# Patient Record
Sex: MALE | Race: WHITE
[De-identification: names, ages, dates, MRNs, and addresses within clinical notes are randomized per-mention and may not be internally consistent; named-entity substitution may affect disease eponyms.]

---

## 2018-02-25 ENCOUNTER — HOSPITAL ENCOUNTER (EMERGENCY)
Dept: HOSPITAL 4 - SED | Age: 63
Discharge: HOME | End: 2018-02-25
Payer: MEDICAID

## 2018-02-25 VITALS — WEIGHT: 260 LBS | HEIGHT: 73 IN | SYSTOLIC BLOOD PRESSURE: 151 MMHG | BODY MASS INDEX: 34.46 KG/M2

## 2018-02-25 VITALS — SYSTOLIC BLOOD PRESSURE: 136 MMHG

## 2018-02-25 DIAGNOSIS — J44.1: Primary | ICD-10-CM

## 2018-02-25 DIAGNOSIS — I25.2: ICD-10-CM

## 2018-02-25 DIAGNOSIS — F17.210: ICD-10-CM

## 2018-02-25 DIAGNOSIS — I10: ICD-10-CM

## 2018-02-25 LAB
ALBUMIN SERPL BCP-MCNC: 3.1 G/DL (ref 3.4–4.8)
ALT SERPL W P-5'-P-CCNC: 23 U/L (ref 12–78)
ANION GAP SERPL CALCULATED.3IONS-SCNC: 3 MMOL/L (ref 5–15)
AST SERPL W P-5'-P-CCNC: 17 U/L (ref 10–37)
BASOPHILS # BLD AUTO: 0 K/UL (ref 0–0.2)
BASOPHILS NFR BLD AUTO: 0.7 % (ref 0–2)
BILIRUB SERPL-MCNC: 0.1 MG/DL (ref 0–1)
BUN SERPL-MCNC: 14 MG/DL (ref 8–21)
CALCIUM SERPL-MCNC: 9.1 MG/DL (ref 8.4–11)
CHLORIDE SERPL-SCNC: 99 MMOL/L (ref 98–107)
CREAT SERPL-MCNC: 1.13 MG/DL (ref 0.55–1.3)
EOSINOPHIL # BLD AUTO: 0 K/UL (ref 0–0.4)
EOSINOPHIL NFR BLD AUTO: 0.5 % (ref 0–4)
ERYTHROCYTE [DISTWIDTH] IN BLOOD BY AUTOMATED COUNT: 12.6 % (ref 9–15)
GFR SERPL CREATININE-BSD FRML MDRD: 84 ML/MIN (ref 90–?)
GLUCOSE SERPL-MCNC: 120 MG/DL (ref 70–99)
HCT VFR BLD AUTO: 45.4 % (ref 36–54)
HGB BLD-MCNC: 15.1 G/DL (ref 14–18)
INR PPP: 0.9 (ref 0.8–1.2)
LYMPHOCYTES # BLD AUTO: 1.3 K/UL (ref 1–5.5)
LYMPHOCYTES NFR BLD AUTO: 19.1 % (ref 20.5–51.5)
MCH RBC QN AUTO: 30 PG (ref 27–31)
MCHC RBC AUTO-ENTMCNC: 33 % (ref 32–36)
MCV RBC AUTO: 91 FL (ref 79–98)
MONOCYTES # BLD MANUAL: 0.6 K/UL (ref 0–1)
MONOCYTES # BLD MANUAL: 8.8 % (ref 1.7–9.3)
NEUTROPHILS # BLD AUTO: 4.8 K/UL (ref 1.8–7.7)
NEUTROPHILS NFR BLD AUTO: 70.9 % (ref 40–70)
PLATELET # BLD AUTO: 226 K/UL (ref 130–430)
POTASSIUM SERPL-SCNC: 4.4 MMOL/L (ref 3.5–5.1)
PROTHROMBIN TIME: 9.6 SECS (ref 9.5–12.5)
RBC # BLD AUTO: 5 MIL/UL (ref 4.2–6.2)
SODIUM SERPLBLD-SCNC: 135 MMOL/L (ref 136–145)
WBC # BLD AUTO: 6.7 K/UL (ref 4.8–10.8)

## 2018-02-25 PROCEDURE — 80053 COMPREHEN METABOLIC PANEL: CPT

## 2018-02-25 PROCEDURE — 83605 ASSAY OF LACTIC ACID: CPT

## 2018-02-25 PROCEDURE — 85730 THROMBOPLASTIN TIME PARTIAL: CPT

## 2018-02-25 PROCEDURE — 36415 COLL VENOUS BLD VENIPUNCTURE: CPT

## 2018-02-25 PROCEDURE — 96375 TX/PRO/DX INJ NEW DRUG ADDON: CPT

## 2018-02-25 PROCEDURE — 84484 ASSAY OF TROPONIN QUANT: CPT

## 2018-02-25 PROCEDURE — 93005 ELECTROCARDIOGRAM TRACING: CPT

## 2018-02-25 PROCEDURE — 96365 THER/PROPH/DIAG IV INF INIT: CPT

## 2018-02-25 PROCEDURE — 87040 BLOOD CULTURE FOR BACTERIA: CPT

## 2018-02-25 PROCEDURE — 71275 CT ANGIOGRAPHY CHEST: CPT

## 2018-02-25 PROCEDURE — 71045 X-RAY EXAM CHEST 1 VIEW: CPT

## 2018-02-25 PROCEDURE — 96366 THER/PROPH/DIAG IV INF ADDON: CPT

## 2018-02-25 PROCEDURE — 85025 COMPLETE CBC W/AUTO DIFF WBC: CPT

## 2018-02-25 PROCEDURE — 94640 AIRWAY INHALATION TREATMENT: CPT

## 2018-02-25 PROCEDURE — 99285 EMERGENCY DEPT VISIT HI MDM: CPT

## 2018-02-25 PROCEDURE — 85610 PROTHROMBIN TIME: CPT

## 2018-10-04 ENCOUNTER — HOSPITAL ENCOUNTER (EMERGENCY)
Dept: HOSPITAL 4 - SED | Age: 63
Discharge: HOME | End: 2018-10-04
Payer: COMMERCIAL

## 2018-10-04 VITALS — HEIGHT: 74 IN | WEIGHT: 250 LBS | BODY MASS INDEX: 32.08 KG/M2

## 2018-10-04 VITALS — SYSTOLIC BLOOD PRESSURE: 146 MMHG

## 2018-10-04 DIAGNOSIS — I25.2: ICD-10-CM

## 2018-10-04 DIAGNOSIS — L03.116: Primary | ICD-10-CM

## 2018-10-04 DIAGNOSIS — J44.9: ICD-10-CM

## 2018-10-04 DIAGNOSIS — F17.210: ICD-10-CM

## 2018-10-04 DIAGNOSIS — I10: ICD-10-CM

## 2018-10-04 LAB
ALBUMIN SERPL BCP-MCNC: 3.4 G/DL (ref 3.4–4.8)
ALT SERPL W P-5'-P-CCNC: 22 U/L (ref 12–78)
ANION GAP SERPL CALCULATED.3IONS-SCNC: 0 MMOL/L (ref 5–15)
APPEARANCE UR: CLEAR
AST SERPL W P-5'-P-CCNC: 15 U/L (ref 10–37)
BASOPHILS # BLD AUTO: 0.1 K/UL (ref 0–0.2)
BASOPHILS NFR BLD AUTO: 1.5 % (ref 0–2)
BILIRUB SERPL-MCNC: 0.1 MG/DL (ref 0–1)
BILIRUB UR QL STRIP: NEGATIVE
BUN SERPL-MCNC: 11 MG/DL (ref 8–21)
CALCIUM SERPL-MCNC: 9.3 MG/DL (ref 8.4–11)
CHLORIDE SERPL-SCNC: 105 MMOL/L (ref 98–107)
COLOR UR: YELLOW
CREAT SERPL-MCNC: 1.08 MG/DL (ref 0.55–1.3)
EOSINOPHIL # BLD AUTO: 0.3 K/UL (ref 0–0.4)
EOSINOPHIL NFR BLD AUTO: 4 % (ref 0–4)
ERYTHROCYTE [DISTWIDTH] IN BLOOD BY AUTOMATED COUNT: 12.9 % (ref 9–15)
ETHANOL SERPL-MCNC: < 3 MG/DL (ref ?–10)
GFR SERPL CREATININE-BSD FRML MDRD: 89 ML/MIN (ref 90–?)
GLUCOSE SERPL-MCNC: 101 MG/DL (ref 70–99)
GLUCOSE UR STRIP-MCNC: NEGATIVE MG/DL
HCT VFR BLD AUTO: 48.8 % (ref 36–54)
HGB BLD-MCNC: 15.7 G/DL (ref 14–18)
HGB UR QL STRIP: NEGATIVE
INR PPP: 0.9 (ref 0.8–1.2)
KETONES UR STRIP-MCNC: NEGATIVE MG/DL
LEUKOCYTE ESTERASE UR QL STRIP: NEGATIVE
LYMPHOCYTES # BLD AUTO: 1.9 K/UL (ref 1–5.5)
LYMPHOCYTES NFR BLD AUTO: 28.8 % (ref 20.5–51.5)
MCH RBC QN AUTO: 30 PG (ref 27–31)
MCHC RBC AUTO-ENTMCNC: 32 % (ref 32–36)
MCV RBC AUTO: 92 FL (ref 79–98)
MONOCYTES # BLD MANUAL: 0.8 K/UL (ref 0–1)
MONOCYTES # BLD MANUAL: 12.4 % (ref 1.7–9.3)
NEUTROPHILS # BLD AUTO: 3.5 K/UL (ref 1.8–7.7)
NEUTROPHILS NFR BLD AUTO: 53.3 % (ref 40–70)
NITRITE UR QL STRIP: NEGATIVE
PH UR STRIP: 5.5 [PH] (ref 5–8)
PLATELET # BLD AUTO: 260 K/UL (ref 130–430)
POTASSIUM SERPL-SCNC: 4.6 MMOL/L (ref 3.5–5.1)
PROT UR QL STRIP: NEGATIVE
PROTHROMBIN TIME: 9.5 SECS (ref 9.5–12.5)
RBC # BLD AUTO: 5.29 MIL/UL (ref 4.2–6.2)
SODIUM SERPLBLD-SCNC: 138 MMOL/L (ref 136–145)
SP GR UR STRIP: >=1.03 (ref 1–1.03)
T4 FREE SERPL-MCNC: 0.6 NG/DL (ref 0.8–1.5)
UROBILINOGEN UR STRIP-MCNC: 0.2 MG/DL (ref 0.2–1)
WBC # BLD AUTO: 6.6 K/UL (ref 4.8–10.8)

## 2018-10-04 PROCEDURE — 83880 ASSAY OF NATRIURETIC PEPTIDE: CPT

## 2018-10-04 PROCEDURE — 84484 ASSAY OF TROPONIN QUANT: CPT

## 2018-10-04 PROCEDURE — 81003 URINALYSIS AUTO W/O SCOPE: CPT

## 2018-10-04 PROCEDURE — G0482 DRUG TEST DEF 15-21 CLASSES: HCPCS

## 2018-10-04 PROCEDURE — 71045 X-RAY EXAM CHEST 1 VIEW: CPT

## 2018-10-04 PROCEDURE — 80053 COMPREHEN METABOLIC PANEL: CPT

## 2018-10-04 PROCEDURE — 99285 EMERGENCY DEPT VISIT HI MDM: CPT

## 2018-10-04 PROCEDURE — 36415 COLL VENOUS BLD VENIPUNCTURE: CPT

## 2018-10-04 PROCEDURE — 87040 BLOOD CULTURE FOR BACTERIA: CPT

## 2018-10-04 PROCEDURE — 84439 ASSAY OF FREE THYROXINE: CPT

## 2018-10-04 PROCEDURE — 85025 COMPLETE CBC W/AUTO DIFF WBC: CPT

## 2018-10-04 PROCEDURE — 85610 PROTHROMBIN TIME: CPT

## 2018-10-04 PROCEDURE — 93970 EXTREMITY STUDY: CPT

## 2018-10-04 PROCEDURE — 83605 ASSAY OF LACTIC ACID: CPT

## 2018-10-04 NOTE — NUR
PT ALERT, WITH COMPLAINTS OF SWOLLEN LEGS, SKIN WARM AND ERYTHEMA TO LEFT LEG, 
WOUND NOTED TO LEFT TOE NEXT TO BIG TOE,  SKIN TO RIGHT LEG COLD, PULSES 
PALPABLE.

## 2018-10-04 NOTE — NUR
Patient given written and verbal discharge instructions and verbalizes 
understanding.  ER MD SIMMONS discussed with patient the results and treatment 
provided. Patient in stable condition. ID arm band removed. 

Rx of BACTRIM AND AUGMENTIN  given. Patient educated on pain management and to 
follow up with PMD. Pain Scale 2.

Opportunity for questions provided and answered. Medication side effect fact 
sheet provided.

## 2018-12-11 ENCOUNTER — HOSPITAL ENCOUNTER (EMERGENCY)
Dept: HOSPITAL 4 - SED | Age: 63
Discharge: HOME | End: 2018-12-11
Payer: COMMERCIAL

## 2018-12-11 VITALS — BODY MASS INDEX: 33.37 KG/M2 | HEIGHT: 74 IN | WEIGHT: 260 LBS

## 2018-12-11 VITALS — SYSTOLIC BLOOD PRESSURE: 146 MMHG

## 2018-12-11 VITALS — SYSTOLIC BLOOD PRESSURE: 151 MMHG

## 2018-12-11 DIAGNOSIS — I11.0: ICD-10-CM

## 2018-12-11 DIAGNOSIS — E11.9: ICD-10-CM

## 2018-12-11 DIAGNOSIS — I25.2: ICD-10-CM

## 2018-12-11 DIAGNOSIS — L03.116: Primary | ICD-10-CM

## 2018-12-11 DIAGNOSIS — I50.9: ICD-10-CM

## 2018-12-11 LAB
ALBUMIN SERPL BCP-MCNC: 3.4 G/DL (ref 3.4–4.8)
ALT SERPL W P-5'-P-CCNC: 26 U/L (ref 12–78)
ANION GAP SERPL CALCULATED.3IONS-SCNC: 7 MMOL/L (ref 5–15)
APPEARANCE UR: CLEAR
AST SERPL W P-5'-P-CCNC: 19 U/L (ref 10–37)
BASOPHILS # BLD AUTO: 0.1 K/UL (ref 0–0.2)
BASOPHILS NFR BLD AUTO: 2 % (ref 0–2)
BILIRUB SERPL-MCNC: 0.2 MG/DL (ref 0–1)
BILIRUB UR QL STRIP: NEGATIVE
BUN SERPL-MCNC: 12 MG/DL (ref 8–21)
CALCIUM SERPL-MCNC: 9 MG/DL (ref 8.4–11)
CHLORIDE SERPL-SCNC: 104 MMOL/L (ref 98–107)
COLOR UR: YELLOW
CREAT SERPL-MCNC: 1.06 MG/DL (ref 0.55–1.3)
EOSINOPHIL # BLD AUTO: 0.2 K/UL (ref 0–0.4)
EOSINOPHIL NFR BLD AUTO: 3.5 % (ref 0–4)
ERYTHROCYTE [DISTWIDTH] IN BLOOD BY AUTOMATED COUNT: 13.3 % (ref 9–15)
GFR SERPL CREATININE-BSD FRML MDRD: 90 ML/MIN (ref 90–?)
GLUCOSE SERPL-MCNC: 117 MG/DL (ref 70–99)
GLUCOSE UR STRIP-MCNC: NEGATIVE MG/DL
HCT VFR BLD AUTO: 46.4 % (ref 36–54)
HGB BLD-MCNC: 15.3 G/DL (ref 14–18)
HGB UR QL STRIP: NEGATIVE
INR PPP: 1 (ref 0.8–1.2)
KETONES UR STRIP-MCNC: NEGATIVE MG/DL
LEUKOCYTE ESTERASE UR QL STRIP: NEGATIVE
LIPASE SERPL-CCNC: 131 U/L (ref 73–393)
LYMPHOCYTES # BLD AUTO: 1.8 K/UL (ref 1–5.5)
LYMPHOCYTES NFR BLD AUTO: 27.1 % (ref 20.5–51.5)
MCH RBC QN AUTO: 31 PG (ref 27–31)
MCHC RBC AUTO-ENTMCNC: 33 % (ref 32–36)
MCV RBC AUTO: 93 FL (ref 79–98)
MONOCYTES # BLD MANUAL: 0.9 K/UL (ref 0–1)
MONOCYTES # BLD MANUAL: 14.2 % (ref 1.7–9.3)
NEUTROPHILS # BLD AUTO: 3.6 K/UL (ref 1.8–7.7)
NEUTROPHILS NFR BLD AUTO: 53.2 % (ref 40–70)
NITRITE UR QL STRIP: NEGATIVE
PH UR STRIP: 5.5 [PH] (ref 5–8)
PLATELET # BLD AUTO: 267 K/UL (ref 130–430)
POTASSIUM SERPL-SCNC: 4 MMOL/L (ref 3.5–5.1)
PROT UR QL STRIP: NEGATIVE
PROTHROMBIN TIME: 9.9 SECS (ref 9.5–12.5)
RBC # BLD AUTO: 4.96 MIL/UL (ref 4.2–6.2)
SODIUM SERPLBLD-SCNC: 140 MMOL/L (ref 136–145)
SP GR UR STRIP: >=1.03 (ref 1–1.03)
UROBILINOGEN UR STRIP-MCNC: 1 MG/DL (ref 0.2–1)
WBC # BLD AUTO: 6.6 K/UL (ref 4.8–10.8)

## 2018-12-11 PROCEDURE — 84484 ASSAY OF TROPONIN QUANT: CPT

## 2018-12-11 PROCEDURE — 36415 COLL VENOUS BLD VENIPUNCTURE: CPT

## 2018-12-11 PROCEDURE — 93005 ELECTROCARDIOGRAM TRACING: CPT

## 2018-12-11 PROCEDURE — 83880 ASSAY OF NATRIURETIC PEPTIDE: CPT

## 2018-12-11 PROCEDURE — 99284 EMERGENCY DEPT VISIT MOD MDM: CPT

## 2018-12-11 PROCEDURE — 81003 URINALYSIS AUTO W/O SCOPE: CPT

## 2018-12-11 PROCEDURE — 96375 TX/PRO/DX INJ NEW DRUG ADDON: CPT

## 2018-12-11 PROCEDURE — 87040 BLOOD CULTURE FOR BACTERIA: CPT

## 2018-12-11 PROCEDURE — 83605 ASSAY OF LACTIC ACID: CPT

## 2018-12-11 PROCEDURE — 96365 THER/PROPH/DIAG IV INF INIT: CPT

## 2018-12-11 PROCEDURE — 85730 THROMBOPLASTIN TIME PARTIAL: CPT

## 2018-12-11 PROCEDURE — 83690 ASSAY OF LIPASE: CPT

## 2018-12-11 PROCEDURE — 71045 X-RAY EXAM CHEST 1 VIEW: CPT

## 2018-12-11 PROCEDURE — 80053 COMPREHEN METABOLIC PANEL: CPT

## 2018-12-11 PROCEDURE — 85025 COMPLETE CBC W/AUTO DIFF WBC: CPT

## 2018-12-11 PROCEDURE — 85610 PROTHROMBIN TIME: CPT

## 2018-12-11 NOTE — NUR
Patient AOx4, ambulatory, presents to ER with complaint of continued redness to 
left lower leg. Patient states he received ABT therapy (Keflex) for the leg 
redness. Patient was also to bear no weight onto left leg and maintain elevated 
above level of the heart. Patient began to see improvement as patient was 
following treatment. The leg redness returned as soon as the patient began to 
bear weight on it. Patient states pain to left lower extremity 7/10. No other 
symptoms or complaints.

## 2018-12-11 NOTE — NUR
Patient given written and verbal discharge instructions and verbalizes 
understanding.  ER MD discussed with patient the results and treatment 
provided. Patient in stable condition. ID arm band removed. IV catheter removed 
intact and dressing applied, no active bleeding. Rx of Keflex and Tylenol 
given. Patient educated on pain management and to follow up with PMD. Pain 
Scale 0/10. Opportunity for questions provided and answered. Medication side 
effect fact sheet provided.

## 2019-01-13 ENCOUNTER — HOSPITAL ENCOUNTER (EMERGENCY)
Dept: HOSPITAL 4 - SED | Age: 64
Discharge: HOME | End: 2019-01-13
Payer: COMMERCIAL

## 2019-01-13 VITALS — SYSTOLIC BLOOD PRESSURE: 137 MMHG

## 2019-01-13 VITALS — HEIGHT: 74 IN | BODY MASS INDEX: 33.37 KG/M2 | SYSTOLIC BLOOD PRESSURE: 144 MMHG | WEIGHT: 260 LBS

## 2019-01-13 DIAGNOSIS — J44.9: ICD-10-CM

## 2019-01-13 DIAGNOSIS — L97.929: ICD-10-CM

## 2019-01-13 DIAGNOSIS — I25.2: ICD-10-CM

## 2019-01-13 DIAGNOSIS — I10: ICD-10-CM

## 2019-01-13 DIAGNOSIS — E11.9: ICD-10-CM

## 2019-01-13 DIAGNOSIS — L03.116: Primary | ICD-10-CM

## 2019-01-13 DIAGNOSIS — F17.210: ICD-10-CM

## 2019-01-13 DIAGNOSIS — Z71.6: ICD-10-CM

## 2019-01-13 LAB
ALBUMIN SERPL BCP-MCNC: 3 G/DL (ref 3.4–4.8)
ALT SERPL W P-5'-P-CCNC: 17 U/L (ref 12–78)
AMYLASE SERPL-CCNC: 29 U/L (ref 0–100)
ANION GAP SERPL CALCULATED.3IONS-SCNC: 11 MMOL/L (ref 5–15)
APPEARANCE UR: CLEAR
AST SERPL W P-5'-P-CCNC: 14 U/L (ref 10–37)
BASOPHILS # BLD AUTO: 0.3 K/UL (ref 0–0.2)
BASOPHILS NFR BLD AUTO: 3.6 % (ref 0–2)
BILIRUB SERPL-MCNC: 0.5 MG/DL (ref 0–1)
BILIRUB UR QL STRIP: NEGATIVE
BUN SERPL-MCNC: 11 MG/DL (ref 8–21)
CALCIUM SERPL-MCNC: 9.1 MG/DL (ref 8.4–11)
CHLORIDE SERPL-SCNC: 100 MMOL/L (ref 98–107)
COLOR UR: YELLOW
CREAT SERPL-MCNC: 1.04 MG/DL (ref 0.55–1.3)
EOSINOPHIL # BLD AUTO: 0.1 K/UL (ref 0–0.4)
EOSINOPHIL NFR BLD AUTO: 1.2 % (ref 0–4)
ERYTHROCYTE [DISTWIDTH] IN BLOOD BY AUTOMATED COUNT: 12.8 % (ref 9–15)
ERYTHROCYTE [SEDIMENTATION RATE] IN BLOOD BY WESTERGREN METHOD: 25 MM/HR (ref 0–15)
GFR SERPL CREATININE-BSD FRML MDRD: 92 ML/MIN (ref 90–?)
GLUCOSE SERPL-MCNC: 99 MG/DL (ref 70–99)
GLUCOSE UR STRIP-MCNC: NEGATIVE MG/DL
HCT VFR BLD AUTO: 44.5 % (ref 36–54)
HGB BLD-MCNC: 14.6 G/DL (ref 14–18)
HGB UR QL STRIP: NEGATIVE
INR PPP: 1 (ref 0.8–1.2)
KETONES UR STRIP-MCNC: NEGATIVE MG/DL
LEUKOCYTE ESTERASE UR QL STRIP: NEGATIVE
LIPASE SERPL-CCNC: 102 U/L (ref 73–393)
LYMPHOCYTES # BLD AUTO: 1.7 K/UL (ref 1–5.5)
LYMPHOCYTES NFR BLD AUTO: 18.5 % (ref 20.5–51.5)
MCH RBC QN AUTO: 31 PG (ref 27–31)
MCHC RBC AUTO-ENTMCNC: 33 % (ref 32–36)
MCV RBC AUTO: 93 FL (ref 79–98)
MONOCYTES # BLD MANUAL: 1.2 K/UL (ref 0–1)
MONOCYTES # BLD MANUAL: 13.3 % (ref 1.7–9.3)
NEUTROPHILS # BLD AUTO: 5.7 K/UL (ref 1.8–7.7)
NEUTROPHILS NFR BLD AUTO: 63.4 % (ref 40–70)
NITRITE UR QL STRIP: NEGATIVE
PH UR STRIP: 5.5 [PH] (ref 5–8)
PLATELET # BLD AUTO: 244 K/UL (ref 130–430)
POTASSIUM SERPL-SCNC: 3.6 MMOL/L (ref 3.5–5.1)
PROT UR QL STRIP: NEGATIVE
PROTHROMBIN TIME: 9.9 SECS (ref 9.5–12.5)
RBC # BLD AUTO: 4.8 MIL/UL (ref 4.2–6.2)
SODIUM SERPLBLD-SCNC: 137 MMOL/L (ref 136–145)
SP GR UR STRIP: <1.005 (ref 1–1.03)
UROBILINOGEN UR STRIP-MCNC: 1 MG/DL (ref 0.2–1)
WBC # BLD AUTO: 9 K/UL (ref 4.8–10.8)

## 2019-01-13 PROCEDURE — 85651 RBC SED RATE NONAUTOMATED: CPT

## 2019-01-13 PROCEDURE — 93005 ELECTROCARDIOGRAM TRACING: CPT

## 2019-01-13 PROCEDURE — 85610 PROTHROMBIN TIME: CPT

## 2019-01-13 PROCEDURE — 82150 ASSAY OF AMYLASE: CPT

## 2019-01-13 PROCEDURE — 85730 THROMBOPLASTIN TIME PARTIAL: CPT

## 2019-01-13 PROCEDURE — 83605 ASSAY OF LACTIC ACID: CPT

## 2019-01-13 PROCEDURE — 82550 ASSAY OF CK (CPK): CPT

## 2019-01-13 PROCEDURE — 81003 URINALYSIS AUTO W/O SCOPE: CPT

## 2019-01-13 PROCEDURE — 83690 ASSAY OF LIPASE: CPT

## 2019-01-13 PROCEDURE — 80053 COMPREHEN METABOLIC PANEL: CPT

## 2019-01-13 PROCEDURE — 99284 EMERGENCY DEPT VISIT MOD MDM: CPT

## 2019-01-13 PROCEDURE — 96375 TX/PRO/DX INJ NEW DRUG ADDON: CPT

## 2019-01-13 PROCEDURE — 96365 THER/PROPH/DIAG IV INF INIT: CPT

## 2019-01-13 PROCEDURE — 87040 BLOOD CULTURE FOR BACTERIA: CPT

## 2019-01-13 PROCEDURE — 71045 X-RAY EXAM CHEST 1 VIEW: CPT

## 2019-01-13 PROCEDURE — 85025 COMPLETE CBC W/AUTO DIFF WBC: CPT

## 2019-01-13 PROCEDURE — 36415 COLL VENOUS BLD VENIPUNCTURE: CPT
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
## 2019-01-13 NOTE — NUR
Patient given written and verbal discharge instructions and verbalizes 
understanding.  ER MD discussed with patient the results and treatment 
provided. Patient in stable condition. ID arm band removed. IV catheter removed 
intact and dressing applied, no active bleeding.

Rx of BACTRIM, BACITRACIN, TYLENOL given. Patient educated on pain management 
and to follow up with PMD. Pain Scale 0/10.

Opportunity for questions provided and answered. Medication side effect fact 
sheet provided. DRESSING SUPPLIES GIVEN TO PT.

## 2019-02-08 ENCOUNTER — HOSPITAL ENCOUNTER (INPATIENT)
Dept: HOSPITAL 4 - SED | Age: 64
LOS: 5 days | Discharge: HOME | DRG: 593 | End: 2019-02-13
Attending: FAMILY MEDICINE | Admitting: FAMILY MEDICINE
Payer: COMMERCIAL

## 2019-02-08 VITALS — WEIGHT: 297.13 LBS | HEIGHT: 74 IN | BODY MASS INDEX: 38.13 KG/M2

## 2019-02-08 VITALS — SYSTOLIC BLOOD PRESSURE: 160 MMHG

## 2019-02-08 VITALS — SYSTOLIC BLOOD PRESSURE: 118 MMHG

## 2019-02-08 DIAGNOSIS — I10: ICD-10-CM

## 2019-02-08 DIAGNOSIS — T37.8X5A: ICD-10-CM
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
## 2019-02-08 DIAGNOSIS — J44.9: ICD-10-CM

## 2019-02-08 DIAGNOSIS — L03.115: ICD-10-CM

## 2019-02-08 DIAGNOSIS — B36.9: ICD-10-CM

## 2019-02-08 DIAGNOSIS — I25.2: ICD-10-CM

## 2019-02-08 DIAGNOSIS — I87.2: ICD-10-CM

## 2019-02-08 DIAGNOSIS — Z71.6: ICD-10-CM

## 2019-02-08 DIAGNOSIS — L27.0: ICD-10-CM

## 2019-02-08 DIAGNOSIS — L97.929: Primary | ICD-10-CM

## 2019-02-08 DIAGNOSIS — F17.200: ICD-10-CM

## 2019-02-08 DIAGNOSIS — E66.01: ICD-10-CM

## 2019-02-08 DIAGNOSIS — T36.8X5A: ICD-10-CM

## 2019-02-08 DIAGNOSIS — L03.116: ICD-10-CM

## 2019-02-08 DIAGNOSIS — E11.9: ICD-10-CM

## 2019-02-08 LAB
ALBUMIN SERPL BCP-MCNC: 2.9 G/DL (ref 3.4–4.8)
ALT SERPL W P-5'-P-CCNC: 17 U/L (ref 12–78)
ANION GAP SERPL CALCULATED.3IONS-SCNC: 7 MMOL/L (ref 5–15)
AST SERPL W P-5'-P-CCNC: 13 U/L (ref 10–37)
BASOPHILS # BLD AUTO: 0 K/UL (ref 0–0.2)
BASOPHILS NFR BLD AUTO: 0.6 % (ref 0–2)
BILIRUB SERPL-MCNC: 0.2 MG/DL (ref 0–1)
BUN SERPL-MCNC: 13 MG/DL (ref 8–21)
CALCIUM SERPL-MCNC: 8.7 MG/DL (ref 8.4–11)
CHLORIDE SERPL-SCNC: 104 MMOL/L (ref 98–107)
CREAT SERPL-MCNC: 1.11 MG/DL (ref 0.55–1.3)
EOSINOPHIL # BLD AUTO: 0.5 K/UL (ref 0–0.4)
EOSINOPHIL NFR BLD AUTO: 7.7 % (ref 0–4)
ERYTHROCYTE [DISTWIDTH] IN BLOOD BY AUTOMATED COUNT: 12.9 % (ref 9–15)
GFR SERPL CREATININE-BSD FRML MDRD: 86 ML/MIN (ref 90–?)
GLUCOSE SERPL-MCNC: 105 MG/DL (ref 70–99)
HCT VFR BLD AUTO: 43.5 % (ref 36–54)
HGB BLD-MCNC: 14.1 G/DL (ref 14–18)
INR PPP: 1 (ref 0.8–1.2)
LYMPHOCYTES # BLD AUTO: 1.4 K/UL (ref 1–5.5)
LYMPHOCYTES NFR BLD AUTO: 21 % (ref 20.5–51.5)
MCH RBC QN AUTO: 30 PG (ref 27–31)
MCHC RBC AUTO-ENTMCNC: 32 % (ref 32–36)
MCV RBC AUTO: 92 FL (ref 79–98)
MONOCYTES # BLD MANUAL: 0.9 K/UL (ref 0–1)
MONOCYTES # BLD MANUAL: 13.4 % (ref 1.7–9.3)
NEUTROPHILS # BLD AUTO: 4 K/UL (ref 1.8–7.7)
NEUTROPHILS NFR BLD AUTO: 57.3 % (ref 40–70)
PLATELET # BLD AUTO: 261 K/UL (ref 130–430)
POTASSIUM SERPL-SCNC: 4.2 MMOL/L (ref 3.5–5.1)
PROTHROMBIN TIME: 10.2 SECS (ref 9.5–12.5)
RBC # BLD AUTO: 4.76 MIL/UL (ref 4.2–6.2)
SODIUM SERPLBLD-SCNC: 143 MMOL/L (ref 136–145)
WBC # BLD AUTO: 6.8 K/UL (ref 4.8–10.8)

## 2019-02-09 VITALS — SYSTOLIC BLOOD PRESSURE: 137 MMHG

## 2019-02-09 VITALS — SYSTOLIC BLOOD PRESSURE: 131 MMHG

## 2019-02-09 VITALS — SYSTOLIC BLOOD PRESSURE: 143 MMHG

## 2019-02-09 LAB
APPEARANCE UR: CLEAR
BILIRUB UR QL STRIP: NEGATIVE
COLOR UR: YELLOW
GLUCOSE UR STRIP-MCNC: NEGATIVE MG/DL
HGB UR QL STRIP: NEGATIVE
KETONES UR STRIP-MCNC: NEGATIVE MG/DL
LEUKOCYTE ESTERASE UR QL STRIP: NEGATIVE
NITRITE UR QL STRIP: NEGATIVE
PH UR STRIP: 5.5 [PH] (ref 5–8)
PROT UR QL STRIP: NEGATIVE
SP GR UR STRIP: >=1.03 (ref 1–1.03)
UROBILINOGEN UR STRIP-MCNC: 1 MG/DL (ref 0.2–1)

## 2019-02-09 RX ADMIN — LEVALBUTEROL SCH MG: 1.25 SOLUTION, CONCENTRATE RESPIRATORY (INHALATION) at 23:09

## 2019-02-09 RX ADMIN — DEXTROSE MONOHYDRATE SCH MLS/HR: 50 INJECTION, SOLUTION INTRAVENOUS at 13:55

## 2019-02-09 RX ADMIN — SODIUM CHLORIDE SCH MLS/HR: 9 INJECTION, SOLUTION INTRAVENOUS at 08:51

## 2019-02-09 RX ADMIN — DEXTROSE MONOHYDRATE SCH MLS/HR: 50 INJECTION, SOLUTION INTRAVENOUS at 06:17

## 2019-02-09 RX ADMIN — DEXTROSE MONOHYDRATE SCH MLS/HR: 50 INJECTION, SOLUTION INTRAVENOUS at 22:34

## 2019-02-09 RX ADMIN — SODIUM CHLORIDE SCH MLS/HR: 9 INJECTION, SOLUTION INTRAVENOUS at 20:30

## 2019-02-09 RX ADMIN — FUROSEMIDE SCH MG: 10 INJECTION, SOLUTION INTRAMUSCULAR; INTRAVENOUS at 22:35

## 2019-02-10 VITALS — SYSTOLIC BLOOD PRESSURE: 130 MMHG

## 2019-02-10 VITALS — SYSTOLIC BLOOD PRESSURE: 143 MMHG

## 2019-02-10 VITALS — SYSTOLIC BLOOD PRESSURE: 135 MMHG

## 2019-02-10 VITALS — SYSTOLIC BLOOD PRESSURE: 133 MMHG

## 2019-02-10 VITALS — SYSTOLIC BLOOD PRESSURE: 140 MMHG

## 2019-02-10 RX ADMIN — FUROSEMIDE SCH MG: 10 INJECTION, SOLUTION INTRAMUSCULAR; INTRAVENOUS at 21:30

## 2019-02-10 RX ADMIN — SODIUM CHLORIDE SCH MLS/HR: 9 INJECTION, SOLUTION INTRAVENOUS at 08:33

## 2019-02-10 RX ADMIN — DEXTROSE MONOHYDRATE SCH MLS/HR: 50 INJECTION, SOLUTION INTRAVENOUS at 05:15

## 2019-02-10 RX ADMIN — LEVALBUTEROL SCH MG: 1.25 SOLUTION, CONCENTRATE RESPIRATORY (INHALATION) at 23:24

## 2019-02-10 RX ADMIN — SODIUM CHLORIDE SCH MLS/HR: 9 INJECTION, SOLUTION INTRAVENOUS at 21:30

## 2019-02-10 RX ADMIN — DEXTROSE MONOHYDRATE SCH MLS/HR: 50 INJECTION, SOLUTION INTRAVENOUS at 13:17

## 2019-02-10 RX ADMIN — LEVALBUTEROL SCH MG: 1.25 SOLUTION, CONCENTRATE RESPIRATORY (INHALATION) at 07:56

## 2019-02-10 RX ADMIN — FLUTICASONE FUROATE AND VILANTEROL TRIFENATATE SCH INH: 200; 25 POWDER RESPIRATORY (INHALATION) at 08:24

## 2019-02-10 RX ADMIN — LEVALBUTEROL SCH MG: 1.25 SOLUTION, CONCENTRATE RESPIRATORY (INHALATION) at 16:00

## 2019-02-10 RX ADMIN — FUROSEMIDE SCH MG: 10 INJECTION, SOLUTION INTRAMUSCULAR; INTRAVENOUS at 08:24

## 2019-02-10 RX ADMIN — DEXTROSE MONOHYDRATE SCH MLS/HR: 50 INJECTION, SOLUTION INTRAVENOUS at 21:30

## 2019-02-10 RX ADMIN — ENOXAPARIN SODIUM SCH MG: 40 INJECTION SUBCUTANEOUS at 08:26

## 2019-02-11 VITALS — SYSTOLIC BLOOD PRESSURE: 119 MMHG

## 2019-02-11 VITALS — SYSTOLIC BLOOD PRESSURE: 133 MMHG

## 2019-02-11 VITALS — SYSTOLIC BLOOD PRESSURE: 136 MMHG

## 2019-02-11 VITALS — SYSTOLIC BLOOD PRESSURE: 135 MMHG

## 2019-02-11 RX ADMIN — SODIUM CHLORIDE SCH MLS/HR: 9 INJECTION, SOLUTION INTRAVENOUS at 08:48

## 2019-02-11 RX ADMIN — FLUTICASONE FUROATE AND VILANTEROL TRIFENATATE SCH INH: 200; 25 POWDER RESPIRATORY (INHALATION) at 08:49

## 2019-02-11 RX ADMIN — LEVALBUTEROL SCH MG: 1.25 SOLUTION, CONCENTRATE RESPIRATORY (INHALATION) at 07:00

## 2019-02-11 RX ADMIN — FUROSEMIDE SCH MG: 10 INJECTION, SOLUTION INTRAMUSCULAR; INTRAVENOUS at 08:48

## 2019-02-11 RX ADMIN — DEXTROSE SCH MLS/HR: 50 INJECTION, SOLUTION INTRAVENOUS at 21:10

## 2019-02-11 RX ADMIN — SODIUM CHLORIDE SCH MLS/HR: 9 INJECTION, SOLUTION INTRAVENOUS at 22:34

## 2019-02-11 RX ADMIN — FUROSEMIDE SCH MG: 10 INJECTION, SOLUTION INTRAMUSCULAR; INTRAVENOUS at 21:10

## 2019-02-11 RX ADMIN — FLUCONAZOLE IN SODIUM CHLORIDE SCH MLS/HR: 2 INJECTION, SOLUTION INTRAVENOUS at 11:11

## 2019-02-11 RX ADMIN — LEVALBUTEROL SCH MG: 1.25 SOLUTION, CONCENTRATE RESPIRATORY (INHALATION) at 23:00

## 2019-02-11 RX ADMIN — ENOXAPARIN SODIUM SCH MG: 40 INJECTION SUBCUTANEOUS at 08:54

## 2019-02-11 RX ADMIN — DEXTROSE MONOHYDRATE SCH MLS/HR: 50 INJECTION, SOLUTION INTRAVENOUS at 06:47

## 2019-02-11 RX ADMIN — LEVALBUTEROL SCH MG: 1.25 SOLUTION, CONCENTRATE RESPIRATORY (INHALATION) at 17:57

## 2019-02-12 VITALS — SYSTOLIC BLOOD PRESSURE: 139 MMHG

## 2019-02-12 VITALS — SYSTOLIC BLOOD PRESSURE: 137 MMHG

## 2019-02-12 VITALS — SYSTOLIC BLOOD PRESSURE: 126 MMHG

## 2019-02-12 VITALS — SYSTOLIC BLOOD PRESSURE: 138 MMHG

## 2019-02-12 RX ADMIN — LEVALBUTEROL SCH MG: 1.25 SOLUTION, CONCENTRATE RESPIRATORY (INHALATION) at 23:05

## 2019-02-12 RX ADMIN — CASTOR OIL AND BALSAM, PERU SCH GM: 788; 87 OINTMENT TOPICAL at 20:53

## 2019-02-12 RX ADMIN — FLUTICASONE FUROATE AND VILANTEROL TRIFENATATE SCH INH: 200; 25 POWDER RESPIRATORY (INHALATION) at 10:09

## 2019-02-12 RX ADMIN — LEVALBUTEROL SCH MG: 1.25 SOLUTION, CONCENTRATE RESPIRATORY (INHALATION) at 08:00

## 2019-02-12 RX ADMIN — FUROSEMIDE SCH MG: 10 INJECTION, SOLUTION INTRAMUSCULAR; INTRAVENOUS at 10:10

## 2019-02-12 RX ADMIN — SODIUM CHLORIDE SCH MLS/HR: 9 INJECTION, SOLUTION INTRAVENOUS at 20:54

## 2019-02-12 RX ADMIN — SODIUM CHLORIDE SCH MLS/HR: 9 INJECTION, SOLUTION INTRAVENOUS at 11:18

## 2019-02-12 RX ADMIN — ENOXAPARIN SODIUM SCH MG: 40 INJECTION SUBCUTANEOUS at 10:17

## 2019-02-12 RX ADMIN — FLUCONAZOLE IN SODIUM CHLORIDE SCH MLS/HR: 2 INJECTION, SOLUTION INTRAVENOUS at 10:09

## 2019-02-12 RX ADMIN — DEXTROSE SCH MLS/HR: 50 INJECTION, SOLUTION INTRAVENOUS at 11:16

## 2019-02-12 RX ADMIN — Medication SCH GM: at 17:01

## 2019-02-12 RX ADMIN — FUROSEMIDE SCH MG: 10 INJECTION, SOLUTION INTRAMUSCULAR; INTRAVENOUS at 20:55

## 2019-02-12 RX ADMIN — LEVALBUTEROL SCH MG: 1.25 SOLUTION, CONCENTRATE RESPIRATORY (INHALATION) at 15:58

## 2019-02-12 RX ADMIN — DEXTROSE SCH MLS/HR: 50 INJECTION, SOLUTION INTRAVENOUS at 19:29

## 2019-02-13 VITALS — SYSTOLIC BLOOD PRESSURE: 137 MMHG

## 2019-02-13 VITALS — SYSTOLIC BLOOD PRESSURE: 134 MMHG

## 2019-02-13 VITALS — SYSTOLIC BLOOD PRESSURE: 105 MMHG

## 2019-02-13 LAB
BASOPHILS # BLD AUTO: 0.1 K/UL (ref 0–0.2)
BASOPHILS NFR BLD AUTO: 1.3 % (ref 0–2)
EOSINOPHIL # BLD AUTO: 0.6 K/UL (ref 0–0.4)
EOSINOPHIL NFR BLD AUTO: 6.7 % (ref 0–4)
ERYTHROCYTE [DISTWIDTH] IN BLOOD BY AUTOMATED COUNT: 13.1 % (ref 9–15)
HCT VFR BLD AUTO: 45.8 % (ref 36–54)
HGB BLD-MCNC: 15 G/DL (ref 14–18)
LYMPHOCYTES # BLD AUTO: 1.7 K/UL (ref 1–5.5)
LYMPHOCYTES NFR BLD AUTO: 20.6 % (ref 20.5–51.5)
MCH RBC QN AUTO: 30 PG (ref 27–31)
MCHC RBC AUTO-ENTMCNC: 33 % (ref 32–36)
MCV RBC AUTO: 90 FL (ref 79–98)
MONOCYTES # BLD MANUAL: 0.9 K/UL (ref 0–1)
MONOCYTES # BLD MANUAL: 11 % (ref 1.7–9.3)
NEUTROPHILS # BLD AUTO: 5.1 K/UL (ref 1.8–7.7)
NEUTROPHILS NFR BLD AUTO: 60.4 % (ref 40–70)
PLATELET # BLD AUTO: 293 K/UL (ref 130–430)
RBC # BLD AUTO: 5.07 MIL/UL (ref 4.2–6.2)
WBC # BLD AUTO: 8.4 K/UL (ref 4.8–10.8)

## 2019-02-13 RX ADMIN — FUROSEMIDE SCH MG: 10 INJECTION, SOLUTION INTRAMUSCULAR; INTRAVENOUS at 08:31

## 2019-02-13 RX ADMIN — FLUCONAZOLE IN SODIUM CHLORIDE SCH MLS/HR: 2 INJECTION, SOLUTION INTRAVENOUS at 09:34

## 2019-02-13 RX ADMIN — CASTOR OIL AND BALSAM, PERU SCH GM: 788; 87 OINTMENT TOPICAL at 09:00

## 2019-02-13 RX ADMIN — FLUTICASONE FUROATE AND VILANTEROL TRIFENATATE SCH INH: 200; 25 POWDER RESPIRATORY (INHALATION) at 08:31

## 2019-02-13 RX ADMIN — LEVALBUTEROL SCH MG: 1.25 SOLUTION, CONCENTRATE RESPIRATORY (INHALATION) at 07:07

## 2019-02-13 RX ADMIN — ENOXAPARIN SODIUM SCH MG: 40 INJECTION SUBCUTANEOUS at 08:29

## 2019-02-13 RX ADMIN — DEXTROSE SCH MLS/HR: 50 INJECTION, SOLUTION INTRAVENOUS at 08:30

## 2019-02-13 RX ADMIN — Medication SCH GM: at 09:33

## 2019-02-13 RX ADMIN — LEVALBUTEROL SCH MG: 1.25 SOLUTION, CONCENTRATE RESPIRATORY (INHALATION) at 15:00
